# Patient Record
Sex: FEMALE | Race: WHITE | NOT HISPANIC OR LATINO | Employment: OTHER | URBAN - METROPOLITAN AREA
[De-identification: names, ages, dates, MRNs, and addresses within clinical notes are randomized per-mention and may not be internally consistent; named-entity substitution may affect disease eponyms.]

---

## 2024-05-21 ENCOUNTER — HOSPITAL ENCOUNTER (EMERGENCY)
Facility: HOSPITAL | Age: 69
Discharge: HOME | End: 2024-05-21
Attending: EMERGENCY MEDICINE | Admitting: EMERGENCY MEDICINE
Payer: COMMERCIAL

## 2024-05-21 VITALS
DIASTOLIC BLOOD PRESSURE: 63 MMHG | TEMPERATURE: 98.8 F | HEART RATE: 86 BPM | RESPIRATION RATE: 20 BRPM | SYSTOLIC BLOOD PRESSURE: 135 MMHG | OXYGEN SATURATION: 96 %

## 2024-05-21 DIAGNOSIS — E83.42 HYPOMAGNESEMIA: ICD-10-CM

## 2024-05-21 DIAGNOSIS — E87.6 HYPOKALEMIA: Primary | ICD-10-CM

## 2024-05-21 LAB
ALBUMIN SERPL BCP-MCNC: 2.5 G/DL (ref 3.5–5)
ALP SERPL-CCNC: 146 U/L (ref 34–104)
ALT SERPL W P-5'-P-CCNC: 19 U/L (ref 7–52)
ANION GAP SERPL CALCULATED.3IONS-SCNC: 11 MMOL/L (ref 4–13)
AST SERPL W P-5'-P-CCNC: 57 U/L (ref 13–39)
BASOPHILS # BLD AUTO: 0.04 THOUSANDS/ÂΜL (ref 0–0.1)
BASOPHILS NFR BLD AUTO: 1 % (ref 0–1)
BILIRUB SERPL-MCNC: 1.97 MG/DL (ref 0.2–1)
BUN SERPL-MCNC: 18 MG/DL (ref 5–25)
CALCIUM ALBUM COR SERPL-MCNC: 10.1 MG/DL (ref 8.3–10.1)
CALCIUM SERPL-MCNC: 8.9 MG/DL (ref 8.4–10.2)
CHLORIDE SERPL-SCNC: 105 MMOL/L (ref 96–108)
CO2 SERPL-SCNC: 26 MMOL/L (ref 21–32)
CREAT SERPL-MCNC: 1.86 MG/DL (ref 0.6–1.3)
EOSINOPHIL # BLD AUTO: 0.1 THOUSAND/ÂΜL (ref 0–0.61)
EOSINOPHIL NFR BLD AUTO: 1 % (ref 0–6)
ERYTHROCYTE [DISTWIDTH] IN BLOOD BY AUTOMATED COUNT: 14.6 % (ref 11.6–15.1)
GFR SERPL CREATININE-BSD FRML MDRD: 27 ML/MIN/1.73SQ M
GLUCOSE SERPL-MCNC: 193 MG/DL (ref 65–140)
HCT VFR BLD AUTO: 32.3 % (ref 34.8–46.1)
HGB BLD-MCNC: 11.1 G/DL (ref 11.5–15.4)
IMM GRANULOCYTES # BLD AUTO: 0.04 THOUSAND/UL (ref 0–0.2)
IMM GRANULOCYTES NFR BLD AUTO: 1 % (ref 0–2)
LYMPHOCYTES # BLD AUTO: 0.69 THOUSANDS/ÂΜL (ref 0.6–4.47)
LYMPHOCYTES NFR BLD AUTO: 9 % (ref 14–44)
MAGNESIUM SERPL-MCNC: 1.3 MG/DL (ref 1.9–2.7)
MCH RBC QN AUTO: 33.4 PG (ref 26.8–34.3)
MCHC RBC AUTO-ENTMCNC: 34.4 G/DL (ref 31.4–37.4)
MCV RBC AUTO: 97 FL (ref 82–98)
MONOCYTES # BLD AUTO: 0.75 THOUSAND/ÂΜL (ref 0.17–1.22)
MONOCYTES NFR BLD AUTO: 10 % (ref 4–12)
NEUTROPHILS # BLD AUTO: 6.2 THOUSANDS/ÂΜL (ref 1.85–7.62)
NEUTS SEG NFR BLD AUTO: 78 % (ref 43–75)
NRBC BLD AUTO-RTO: 0 /100 WBCS
PLATELET # BLD AUTO: 140 THOUSANDS/UL (ref 149–390)
PMV BLD AUTO: 10.7 FL (ref 8.9–12.7)
POTASSIUM SERPL-SCNC: 2.8 MMOL/L (ref 3.5–5.3)
PROT SERPL-MCNC: 6.5 G/DL (ref 6.4–8.4)
RBC # BLD AUTO: 3.32 MILLION/UL (ref 3.81–5.12)
SODIUM SERPL-SCNC: 142 MMOL/L (ref 135–147)
WBC # BLD AUTO: 7.82 THOUSAND/UL (ref 4.31–10.16)

## 2024-05-21 PROCEDURE — 36415 COLL VENOUS BLD VENIPUNCTURE: CPT | Performed by: PHYSICIAN ASSISTANT

## 2024-05-21 PROCEDURE — 96367 TX/PROPH/DG ADDL SEQ IV INF: CPT

## 2024-05-21 PROCEDURE — 85025 COMPLETE CBC W/AUTO DIFF WBC: CPT | Performed by: PHYSICIAN ASSISTANT

## 2024-05-21 PROCEDURE — 99284 EMERGENCY DEPT VISIT MOD MDM: CPT | Performed by: PHYSICIAN ASSISTANT

## 2024-05-21 PROCEDURE — 93005 ELECTROCARDIOGRAM TRACING: CPT

## 2024-05-21 PROCEDURE — 96365 THER/PROPH/DIAG IV INF INIT: CPT

## 2024-05-21 PROCEDURE — 80053 COMPREHEN METABOLIC PANEL: CPT | Performed by: PHYSICIAN ASSISTANT

## 2024-05-21 PROCEDURE — 83735 ASSAY OF MAGNESIUM: CPT | Performed by: PHYSICIAN ASSISTANT

## 2024-05-21 PROCEDURE — 99283 EMERGENCY DEPT VISIT LOW MDM: CPT

## 2024-05-21 RX ORDER — POTASSIUM CHLORIDE 14.9 MG/ML
20 INJECTION INTRAVENOUS ONCE
Status: COMPLETED | OUTPATIENT
Start: 2024-05-21 | End: 2024-05-21

## 2024-05-21 RX ORDER — POTASSIUM CHLORIDE 20 MEQ/1
40 TABLET, EXTENDED RELEASE ORAL ONCE
Status: COMPLETED | OUTPATIENT
Start: 2024-05-21 | End: 2024-05-21

## 2024-05-21 RX ORDER — MAGNESIUM SULFATE HEPTAHYDRATE 40 MG/ML
2 INJECTION, SOLUTION INTRAVENOUS ONCE
Status: COMPLETED | OUTPATIENT
Start: 2024-05-21 | End: 2024-05-21

## 2024-05-21 RX ORDER — POTASSIUM CHLORIDE 20 MEQ/1
20 TABLET, EXTENDED RELEASE ORAL 2 TIMES DAILY
Qty: 10 TABLET | Refills: 0 | Status: SHIPPED | OUTPATIENT
Start: 2024-05-22 | End: 2024-05-27

## 2024-05-21 RX ADMIN — POTASSIUM CHLORIDE 40 MEQ: 1500 TABLET, EXTENDED RELEASE ORAL at 15:29

## 2024-05-21 RX ADMIN — MAGNESIUM SULFATE HEPTAHYDRATE 2 G: 40 INJECTION, SOLUTION INTRAVENOUS at 15:30

## 2024-05-21 RX ADMIN — POTASSIUM CHLORIDE 20 MEQ: 200 INJECTION, SOLUTION INTRAVENOUS at 16:54

## 2024-05-21 NOTE — DISCHARGE INSTRUCTIONS
Take potassium as prescribed.    Please follow-up with your family doctor in 1-2 days. Return to the ER with any worsening symptoms.

## 2024-05-21 NOTE — ED PROVIDER NOTES
"History  Chief Complaint   Patient presents with    Vomiting     Pt presents to ER from [facility] for reports of vomitting x3 today. EMS states pts family convinced her to go to hospital r/t recent dx of low potassium - patient unsure of lab value or treatment, states \"my daughter just had me eat a banana.\" Patient denies n/v for EMS and for RN upon triage. Offers no current complaints.      70yo female with a history of cirrhosis, CHF, hypertension, hyperlipidemia, COPD, CKD, and recent admission for bacteremia in March 2024 currently on indefinite ciprofloxacin presenting via EMS for evaluation of an abnormal outpatient lab. Patient is in the [facility] [place] for her family member[place]. She received a call today from her visiting nurse stating that her potassium was low and that she should report to the ED. She receives weekly labs every Monday and labs drawn yesterday show a potassium of 2.4. She had three episodes of vomiting earlier today but is asymptomatic currently. She denies any diarrhea, chest pain, abdominal pain, shortness of breath, fevers. She ate [employer] last night for dinner. She states she is only here because her daughter made her come and she wanted to wait to go to the ED tomorrow when she returns home.       History provided by:  Patient, medical records and EMS personnel   used: No    Vomiting  Associated symptoms: no chills and no fever        None       Past Medical History:   Diagnosis Date    Ambulatory dysfunction     uses walker    Bacteremia     has central line R upper chest wall, not currently getting abx       Past Surgical History:   Procedure Laterality Date    IR PARACENTESIS  11/4/2020    IR TIPS (TRANSJUGULAR INTRAHEPATIC PORTOSYSTEMIC SHUNT)  9/27/2019    IR TIPS (TRANSJUGULAR INTRAHEPATIC PORTOSYSTEMIC SHUNT)  9/12/2019    IR TIPS (TRANSJUGULAR INTRAHEPATIC PORTOSYSTEMIC SHUNT)  6/11/2019    IR TIPS (TRANSJUGULAR INTRAHEPATIC " PORTOSYSTEMIC SHUNT)  8/15/2018    IR TIPS (TRANSJUGULAR INTRAHEPATIC PORTOSYSTEMIC SHUNT)  8/15/2018       History reviewed. No pertinent family history.  I have reviewed and agree with the history as documented.    E-Cigarette/Vaping     E-Cigarette/Vaping Substances     Social History     Tobacco Use    Smoking status: Former     Average packs/day: 1 pack/day for 61.2 years (61.2 ttl pk-yrs)     Types: Cigarettes     Start date: 1963    Smokeless tobacco: Never   Substance Use Topics    Alcohol use: Never       Review of Systems   Constitutional:  Negative for chills and fever.   HENT:  Negative for drooling and voice change.    Eyes:  Negative for discharge and redness.   Respiratory:  Negative for shortness of breath and stridor.    Cardiovascular:  Negative for chest pain and leg swelling.   Gastrointestinal:  Positive for vomiting (resolved). Negative for nausea.   Musculoskeletal:  Negative for neck pain and neck stiffness.   Skin:  Negative for color change and rash.   Neurological:  Negative for seizures and syncope.   Psychiatric/Behavioral:  Negative for confusion. The patient is not nervous/anxious.    All other systems reviewed and are negative.      Physical Exam  Physical Exam  Vitals and nursing note reviewed.   Constitutional:       General: She is not in acute distress.     Appearance: She is well-developed. She is not diaphoretic.      Comments: Chronically ill appearing in no distress   HENT:      Head: Normocephalic and atraumatic.      Right Ear: External ear normal.      Left Ear: External ear normal.      Nose: Nose normal.   Eyes:      General: No scleral icterus.        Right eye: No discharge.         Left eye: No discharge.      Conjunctiva/sclera: Conjunctivae normal.   Cardiovascular:      Rate and Rhythm: Normal rate and regular rhythm.      Heart sounds: Normal heart sounds. No murmur heard.  Pulmonary:      Effort: Pulmonary effort is normal. No respiratory distress.      Breath  sounds: Normal breath sounds. No stridor. No wheezing or rales.   Abdominal:      General: Bowel sounds are normal. There is no distension.      Palpations: Abdomen is soft.      Tenderness: There is no abdominal tenderness. There is no guarding.   Musculoskeletal:         General: No deformity. Normal range of motion.      Cervical back: Normal range of motion and neck supple.   Lymphadenopathy:      Cervical: No cervical adenopathy.   Skin:     General: Skin is warm and dry.   Neurological:      Mental Status: She is alert. She is not disoriented.      GCS: GCS eye subscore is 4. GCS verbal subscore is 5. GCS motor subscore is 6.   Psychiatric:         Behavior: Behavior normal.         Vital Signs  ED Triage Vitals   Temperature Pulse Respirations Blood Pressure SpO2   05/21/24 1452 05/21/24 1255 05/21/24 1255 05/21/24 1255 05/21/24 1255   98.8 °F (37.1 °C) 86 20 135/63 96 %      Temp Source Heart Rate Source Patient Position - Orthostatic VS BP Location FiO2 (%)   05/21/24 1452 05/21/24 1255 05/21/24 1255 05/21/24 1255 --   Oral Monitor Sitting Left arm       Pain Score       05/21/24 1255       5           Vitals:    05/21/24 1255   BP: 135/63   Pulse: 86   Patient Position - Orthostatic VS: Sitting         Visual Acuity      ED Medications  Medications   potassium chloride (Klor-Con M20) CR tablet 40 mEq (40 mEq Oral Given 5/21/24 1529)   magnesium sulfate 2 g/50 mL IVPB (premix) 2 g (0 g Intravenous Stopped 5/21/24 1654)   potassium chloride 20 mEq IVPB (premix) (0 mEq Intravenous Stopped 5/21/24 1744)       Diagnostic Studies  Results Reviewed       Procedure Component Value Units Date/Time    Comprehensive metabolic panel [[id number]]  (Abnormal) Collected: 05/21/24 1353    Lab Status: Final result Specimen: Blood from Arm, Left Updated: 05/21/24 1420     Sodium 142 mmol/L      Potassium 2.8 mmol/L      Chloride 105 mmol/L      CO2 26 mmol/L      ANION GAP 11 mmol/L      BUN 18 mg/dL      Creatinine 1.86  mg/dL      Glucose 193 mg/dL      Calcium 8.9 mg/dL      Corrected Calcium 10.1 mg/dL      AST 57 U/L      ALT 19 U/L      Alkaline Phosphatase 146 U/L      Total Protein 6.5 g/dL      Albumin 2.5 g/dL      Total Bilirubin 1.97 mg/dL      eGFR 27 ml/min/1.73sq m     Narrative:      National Kidney Disease Foundation guidelines for Chronic Kidney Disease (CKD):     Stage 1 with normal or high GFR (GFR > 90 mL/min/1.73 square meters)    Stage 2 Mild CKD (GFR = 60-89 mL/min/1.73 square meters)    Stage 3A Moderate CKD (GFR = 45-59 mL/min/1.73 square meters)    Stage 3B Moderate CKD (GFR = 30-44 mL/min/1.73 square meters)    Stage 4 Severe CKD (GFR = 15-29 mL/min/1.73 square meters)    Stage 5 End Stage CKD (GFR <15 mL/min/1.73 square meters)  Note: GFR calculation is accurate only with a steady state creatinine    Magnesium [[id number]]  (Abnormal) Collected: 05/21/24 1353    Lab Status: Final result Specimen: Blood from Arm, Left Updated: 05/21/24 1420     Magnesium 1.3 mg/dL     CBC and differential [[id number]]  (Abnormal) Collected: 05/21/24 1353    Lab Status: Final result Specimen: Blood from Arm, Left Updated: 05/21/24 1402     WBC 7.82 Thousand/uL      RBC 3.32 Million/uL      Hemoglobin 11.1 g/dL      Hematocrit 32.3 %      MCV 97 fL      MCH 33.4 pg      MCHC 34.4 g/dL      RDW 14.6 %      MPV 10.7 fL      Platelets 140 Thousands/uL      nRBC 0 /100 WBCs      Segmented % 78 %      Immature Grans % 1 %      Lymphocytes % 9 %      Monocytes % 10 %      Eosinophils Relative 1 %      Basophils Relative 1 %      Absolute Neutrophils 6.20 Thousands/µL      Absolute Immature Grans 0.04 Thousand/uL      Absolute Lymphocytes 0.69 Thousands/µL      Absolute Monocytes 0.75 Thousand/µL      Eosinophils Absolute 0.10 Thousand/µL      Basophils Absolute 0.04 Thousands/µL                    No orders to display              Procedures  ECG 12 Lead Documentation Only    Date/Time: 5/21/2024 2:59 PM    Performed by: Taya SMITH  JACKIE Gutierrez  Authorized by: Taya Gutierrez PA-C    Indications / Diagnosis:  Hypokalemia  ECG reviewed by me, the ED Provider: yes    Patient location:  ED  Rate:     ECG rate:  82    ECG rate assessment: normal    Rhythm:     Rhythm: sinus rhythm    Ectopy:     Ectopy: none    QRS:     QRS axis:  Normal  Conduction:     Conduction: normal    ST segments:     ST segments:  Non-specific  T waves:     T waves: non-specific    Comments:      QTc 457           ED Course               Identification of Seniors at Risk      Flowsheet Row Most Recent Value   (ISAR) Identification of Seniors at Risk    Before the illness or injury that brought you to the Emergency, did you need someone to help you on a regular basis? 1 Filed at: 05/21/2024 1258   In the last 24 hours, have you needed more help than usual? 1 Filed at: 05/21/2024 1258   Have you been hospitalized for one or more nights during the past 6 months? 1 Filed at: 05/21/2024 1258   In general, do you see well? 0 Filed at: 05/21/2024 1258   In general, do you have serious problems with your memory? 0 Filed at: 05/21/2024 1258   Do you take more than three different medications every day? 1 Filed at: 05/21/2024 1258   ISAR Score 4 Filed at: 05/21/2024 1258                        SBIRT 20yo+      Flowsheet Row Most Recent Value   Initial Alcohol Screen: US AUDIT-C     1. How often do you have a drink containing alcohol? 0 Filed at: 05/21/2024 1258   2. How many drinks containing alcohol do you have on a typical day you are drinking?  0 Filed at: 05/21/2024 1258   3b. FEMALE Any Age, or MALE 65+: How often do you have 4 or more drinks on one occassion? 0 Filed at: 05/21/2024 1258   Audit-C Score 0 Filed at: 05/21/2024 1258   ELIAS: How many times in the past year have you...    Used an illegal drug or used a prescription medication for non-medical reasons? Never Filed at: 05/21/2024 1258                      Medical Decision Making  69yoF here for a potassium of  2.4 found on routine outpatient labs yesterday. Had vomiting earlier today which has resolved. Currently asymptomatic. Patient on Bumex daily. She is afebrile and hemodynamically stable. She is non-toxic appearing and she is in no distress.    Initial ED plan: Check CBC, CMP, magnesium, and EKG.    Final assessment: Potassium 2.8 which has improved from yesterday. Magnesium 1.3. Renal function appears at baseline. No hypokalemic changes on EKG. IV/PO potassium and IV magnesium replacement ordered. Patient signed out to Sidra RIVERA prior to potassium infusion. Will plan to discharge patient on 20mEq Kdur BID x 5 days and f/u with PCP for repeat labs. Patient in agreement with plan.        Problems Addressed:  Hypokalemia: acute illness or injury  Hypomagnesemia: acute illness or injury    Amount and/or Complexity of Data Reviewed  Labs: ordered.  ECG/medicine tests: ordered and independent interpretation performed.    Risk  Prescription drug management.             Disposition  Final diagnoses:   Hypokalemia   Hypomagnesemia     Time reflects when diagnosis was documented in both MDM as applicable and the Disposition within this note       Time User Action Codes Description Comment    5/21/2024  3:40 PM Taya Gutierrez Add [E87.6] Hypokalemia     5/21/2024  3:40 PM Taya Gutierrez Add [E83.42] Hypomagnesemia           ED Disposition       ED Disposition   Discharge    Condition   Stable    Date/Time   Tue May 21, 2024 1540    Comment   Shivani Oliver discharge to home/self care.                   Follow-up Information       Follow up With Specialties Details Why Contact Info Additional Information    your family doctor  Schedule an appointment as soon as possible for a visit        [facility] Department Emergency Medicine  If symptoms worsen [address] [place] [address]  [phone] [facility] Department, [address]  [place], [place], [address]            Discharge Medication List as of 5/21/2024  3:42 PM        START taking these medications    Details   potassium chloride (Klor-Con M20) 20 mEq tablet Take 1 tablet (20 mEq total) by mouth 2 (two) times a day for 5 days Do not start before May 22, 2024., Starting Wed 5/22/2024, Until Mon 5/27/2024, Normal             No discharge procedures on file.    PDMP Review       None            ED Provider  Electronically Signed by             Taya Gutierrez PA-C  05/21/24 3593

## 2024-05-24 LAB
ATRIAL RATE: 82 BPM
P AXIS: 86 DEGREES
PR INTERVAL: 158 MS
QRS AXIS: 46 DEGREES
QRSD INTERVAL: 88 MS
QT INTERVAL: 392 MS
QTC INTERVAL: 457 MS
T WAVE AXIS: 159 DEGREES
VENTRICULAR RATE: 82 BPM

## 2024-05-24 PROCEDURE — 93010 ELECTROCARDIOGRAM REPORT: CPT | Performed by: INTERNAL MEDICINE
